# Patient Record
Sex: FEMALE | Race: WHITE
[De-identification: names, ages, dates, MRNs, and addresses within clinical notes are randomized per-mention and may not be internally consistent; named-entity substitution may affect disease eponyms.]

---

## 2017-07-11 ENCOUNTER — HOSPITAL ENCOUNTER (EMERGENCY)
Dept: HOSPITAL 4 - SED | Age: 57
Discharge: HOME | End: 2017-07-11
Payer: COMMERCIAL

## 2017-07-11 VITALS — BODY MASS INDEX: 23.92 KG/M2 | WEIGHT: 130 LBS | HEIGHT: 62 IN

## 2017-07-11 VITALS — SYSTOLIC BLOOD PRESSURE: 123 MMHG

## 2017-07-11 DIAGNOSIS — J44.0: ICD-10-CM

## 2017-07-11 DIAGNOSIS — S16.1XXA: Primary | ICD-10-CM
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
## 2017-07-11 DIAGNOSIS — Z90.89: ICD-10-CM

## 2017-07-11 DIAGNOSIS — Z90.710: ICD-10-CM

## 2017-07-11 PROCEDURE — 72040 X-RAY EXAM NECK SPINE 2-3 VW: CPT

## 2017-07-11 PROCEDURE — 99284 EMERGENCY DEPT VISIT MOD MDM: CPT

## 2020-08-31 ENCOUNTER — HOSPITAL ENCOUNTER (INPATIENT)
Dept: HOSPITAL 15 - ER | Age: 60
LOS: 3 days | Discharge: HOME | DRG: 137 | End: 2020-09-03
Attending: INTERNAL MEDICINE | Admitting: INTERNAL MEDICINE
Payer: COMMERCIAL

## 2020-08-31 VITALS — HEIGHT: 63 IN | BODY MASS INDEX: 24.61 KG/M2 | WEIGHT: 138.89 LBS

## 2020-08-31 VITALS — SYSTOLIC BLOOD PRESSURE: 103 MMHG | DIASTOLIC BLOOD PRESSURE: 67 MMHG

## 2020-08-31 DIAGNOSIS — E87.6: ICD-10-CM

## 2020-08-31 DIAGNOSIS — J44.1: ICD-10-CM

## 2020-08-31 DIAGNOSIS — U07.1: Primary | ICD-10-CM

## 2020-08-31 DIAGNOSIS — Z90.49: ICD-10-CM

## 2020-08-31 DIAGNOSIS — F15.10: ICD-10-CM

## 2020-08-31 DIAGNOSIS — J12.89: ICD-10-CM

## 2020-08-31 LAB
ALBUMIN SERPL-MCNC: 3.4 G/DL (ref 3.4–5)
ALP SERPL-CCNC: 122 U/L (ref 45–117)
ALT SERPL-CCNC: 89 U/L (ref 13–56)
ANION GAP SERPL CALCULATED.3IONS-SCNC: 5 MMOL/L (ref 5–15)
BILIRUB SERPL-MCNC: 0.3 MG/DL (ref 0.2–1)
BUN SERPL-MCNC: 17 MG/DL (ref 7–18)
BUN/CREAT SERPL: 17.7
CALCIUM SERPL-MCNC: 8.1 MG/DL (ref 8.5–10.1)
CHLORIDE SERPL-SCNC: 106 MMOL/L (ref 98–107)
CO2 SERPL-SCNC: 28 MMOL/L (ref 21–32)
GLUCOSE SERPL-MCNC: 97 MG/DL (ref 74–106)
HCT VFR BLD AUTO: 40.5 % (ref 36–46)
HGB BLD-MCNC: 13.7 G/DL (ref 12.2–16.2)
MCH RBC QN AUTO: 31.5 PG (ref 28–32)
MCV RBC AUTO: 93.4 FL (ref 80–100)
NRBC BLD QL AUTO: 0.3 %
POTASSIUM SERPL-SCNC: 2.7 MMOL/L (ref 3.5–5.1)
POTASSIUM SERPL-SCNC: 4 MMOL/L (ref 3.5–5.1)
PROT SERPL-MCNC: 7 G/DL (ref 6.4–8.2)
SODIUM SERPL-SCNC: 139 MMOL/L (ref 136–145)

## 2020-08-31 PROCEDURE — 83615 LACTATE (LD) (LDH) ENZYME: CPT

## 2020-08-31 PROCEDURE — 85379 FIBRIN DEGRADATION QUANT: CPT

## 2020-08-31 PROCEDURE — 83880 ASSAY OF NATRIURETIC PEPTIDE: CPT

## 2020-08-31 PROCEDURE — 81001 URINALYSIS AUTO W/SCOPE: CPT

## 2020-08-31 PROCEDURE — 82728 ASSAY OF FERRITIN: CPT

## 2020-08-31 PROCEDURE — 80053 COMPREHEN METABOLIC PANEL: CPT

## 2020-08-31 PROCEDURE — 84132 ASSAY OF SERUM POTASSIUM: CPT

## 2020-08-31 PROCEDURE — 86141 C-REACTIVE PROTEIN HS: CPT

## 2020-08-31 PROCEDURE — 83735 ASSAY OF MAGNESIUM: CPT

## 2020-08-31 PROCEDURE — 71045 X-RAY EXAM CHEST 1 VIEW: CPT

## 2020-08-31 PROCEDURE — 87086 URINE CULTURE/COLONY COUNT: CPT

## 2020-08-31 PROCEDURE — 84484 ASSAY OF TROPONIN QUANT: CPT

## 2020-08-31 PROCEDURE — 36415 COLL VENOUS BLD VENIPUNCTURE: CPT

## 2020-08-31 PROCEDURE — 93005 ELECTROCARDIOGRAM TRACING: CPT

## 2020-08-31 PROCEDURE — 80307 DRUG TEST PRSMV CHEM ANLYZR: CPT

## 2020-08-31 PROCEDURE — 83605 ASSAY OF LACTIC ACID: CPT

## 2020-08-31 PROCEDURE — 83036 HEMOGLOBIN GLYCOSYLATED A1C: CPT

## 2020-08-31 PROCEDURE — 99291 CRITICAL CARE FIRST HOUR: CPT

## 2020-08-31 PROCEDURE — 85025 COMPLETE CBC W/AUTO DIFF WBC: CPT

## 2020-08-31 PROCEDURE — 94640 AIRWAY INHALATION TREATMENT: CPT

## 2020-08-31 PROCEDURE — 84443 ASSAY THYROID STIM HORMONE: CPT

## 2020-08-31 PROCEDURE — 87081 CULTURE SCREEN ONLY: CPT

## 2020-08-31 RX ADMIN — BUDESONIDE SCH MCG: 180 AEROSOL, POWDER RESPIRATORY (INHALATION) at 22:00

## 2020-08-31 RX ADMIN — ALBUTEROL SULFATE SCH MCG: 108 AEROSOL, METERED RESPIRATORY (INHALATION) at 22:00

## 2020-08-31 NOTE — NUR
Telemetry admit from OKSANA HYLTON admitted to Telemetry unit after NO SBAR received.  Patient oriented to YOKASTA BROWN, RN primary RN, unit, room, bed, and unit policies regarding patient care and 
visiting hours. Patient now on continuous telemetry monitoring, tele box # 11 and telemetry 
reading on arrival to unit is sr 74BPM. Patient placed weighed by bedscale and encouraged to 
call if they need something. All questions and concerns addressed, patient verbalized 
understanding. bed in low position and call light within reach.

## 2020-09-01 VITALS — DIASTOLIC BLOOD PRESSURE: 74 MMHG | SYSTOLIC BLOOD PRESSURE: 124 MMHG

## 2020-09-01 VITALS — DIASTOLIC BLOOD PRESSURE: 73 MMHG | SYSTOLIC BLOOD PRESSURE: 121 MMHG

## 2020-09-01 VITALS — DIASTOLIC BLOOD PRESSURE: 77 MMHG | SYSTOLIC BLOOD PRESSURE: 110 MMHG

## 2020-09-01 VITALS — SYSTOLIC BLOOD PRESSURE: 116 MMHG | DIASTOLIC BLOOD PRESSURE: 58 MMHG

## 2020-09-01 VITALS — DIASTOLIC BLOOD PRESSURE: 67 MMHG | SYSTOLIC BLOOD PRESSURE: 103 MMHG

## 2020-09-01 VITALS — DIASTOLIC BLOOD PRESSURE: 69 MMHG | SYSTOLIC BLOOD PRESSURE: 121 MMHG

## 2020-09-01 VITALS — DIASTOLIC BLOOD PRESSURE: 58 MMHG | SYSTOLIC BLOOD PRESSURE: 116 MMHG

## 2020-09-01 LAB
ALBUMIN SERPL-MCNC: 3 G/DL (ref 3.4–5)
ALCOHOL, URINE: < 3 MG/DL (ref 0–10)
ALP SERPL-CCNC: 103 U/L (ref 45–117)
ALT SERPL-CCNC: 73 U/L (ref 13–56)
AMPHETAMINES UR QL SCN: POSITIVE
ANION GAP SERPL CALCULATED.3IONS-SCNC: 2 MMOL/L (ref 5–15)
BARBITURATES UR QL SCN: NEGATIVE
BENZODIAZ UR QL SCN: NEGATIVE
BILIRUB SERPL-MCNC: 0.3 MG/DL (ref 0.2–1)
BUN SERPL-MCNC: 10 MG/DL (ref 7–18)
BUN/CREAT SERPL: 19.6
BZE UR QL SCN: NEGATIVE
CALCIUM SERPL-MCNC: 8 MG/DL (ref 8.5–10.1)
CANNABINOIDS UR QL SCN: NEGATIVE
CHLORIDE SERPL-SCNC: 110 MMOL/L (ref 98–107)
CO2 SERPL-SCNC: 28 MMOL/L (ref 21–32)
GLUCOSE SERPL-MCNC: 119 MG/DL (ref 74–106)
HCT VFR BLD AUTO: 37.5 % (ref 36–46)
HGB BLD-MCNC: 12.8 G/DL (ref 12.2–16.2)
MCH RBC QN AUTO: 31.8 PG (ref 28–32)
MCV RBC AUTO: 93.5 FL (ref 80–100)
NRBC BLD QL AUTO: 0.3 %
OPIATES UR QL SCN: NEGATIVE
PCP UR QL SCN: NEGATIVE
POTASSIUM SERPL-SCNC: 4.4 MMOL/L (ref 3.5–5.1)
PROT SERPL-MCNC: 6.3 G/DL (ref 6.4–8.2)
SODIUM SERPL-SCNC: 140 MMOL/L (ref 136–145)

## 2020-09-01 RX ADMIN — ZINC SULFATE CAP 220 MG (50 MG ELEMENTAL ZN) SCH MG: 220 (50 ZN) CAP at 09:50

## 2020-09-01 RX ADMIN — ALBUTEROL SULFATE SCH MCG: 108 AEROSOL, METERED RESPIRATORY (INHALATION) at 14:17

## 2020-09-01 RX ADMIN — BUDESONIDE SCH MCG: 180 AEROSOL, POWDER RESPIRATORY (INHALATION) at 21:44

## 2020-09-01 RX ADMIN — ENOXAPARIN SODIUM SCH MG: 40 INJECTION SUBCUTANEOUS at 09:50

## 2020-09-01 RX ADMIN — ALBUTEROL SULFATE SCH MCG: 108 AEROSOL, METERED RESPIRATORY (INHALATION) at 06:55

## 2020-09-01 RX ADMIN — HYDROCODONE BITARTRATE AND ACETAMINOPHEN PRN TAB: 5; 325 TABLET ORAL at 09:50

## 2020-09-01 RX ADMIN — Medication SCH MG: at 09:52

## 2020-09-01 RX ADMIN — Medication SCH UNIT: at 09:52

## 2020-09-01 RX ADMIN — ALBUTEROL SULFATE SCH MCG: 108 AEROSOL, METERED RESPIRATORY (INHALATION) at 21:44

## 2020-09-01 RX ADMIN — BUDESONIDE SCH MCG: 180 AEROSOL, POWDER RESPIRATORY (INHALATION) at 06:55

## 2020-09-01 RX ADMIN — HYDROCODONE BITARTRATE AND ACETAMINOPHEN PRN TAB: 5; 325 TABLET ORAL at 00:25

## 2020-09-01 RX ADMIN — MORPHINE SULFATE PRN MG: 2 INJECTION, SOLUTION INTRAMUSCULAR; INTRAVENOUS at 20:03

## 2020-09-01 NOTE — NUR
Md at bedside



Dr. Alvarenga at bedside discussing POC with patient at this time. This RN at bedside.

## 2020-09-01 NOTE — NUR
Respiratory note:

HR 66, RR 14, SPO2 100% ON RA, BS DIMINISHED. MDI/DPI NOT AT BEDSIDE. WILL ORDER FROM 
PHARMACY. NO SIGNS OR SYMPTOMS OF RESPIRATORY DISTRESS NOTED AT THIS TIME.  CRYSTAL 
AT BEDSIDE.

## 2020-09-01 NOTE — NUR
Respiratory note:

PT SEEN AND ASSESSED AT THIS TIME, NO RESPIRATORY DISTRESS NOTED. PT WAS SLEEPING WHEN 
ENTERING THE ROOM.

HR 75 RR 18 SP02 94% ON 1L NASAL CANNULA.

## 2020-09-01 NOTE — NUR
Nutrition Consult/assessment Notes



please see attached link for complete assessment



Est energy needs BW 59 [id number] kcal (25-30kcal/kg BW), Est protein needs: 59-70g 
(1-1.2g/kg BW). Will reassess prn.  




-------------------------------------------------------------------------------

Addendum: 20 at 1134 by Viridiana Ndiaye RD

-------------------------------------------------------------------------------

Amended: Links added.

## 2020-09-01 NOTE — NUR
PASSWORDS UPDATE

Patient requests her password to be "MOST", will note and ask family members for password 
before any updates are given.

## 2020-09-01 NOTE — NUR
Opening shift note



Assumed care, patient found to be AOx4 resting in bed. No s/s of distress noted or reported 
at this time. Patient states she has pain 4/10 in chest area that increases when she coughs 
and radiates to her back. Patient refusing pain medication at this time and states she will 
notify RN when needed. Update on POC give to patient and education regarding use of call 
light given. Patient verbalizes understanding at this time. Bed in low locked position, call 
light within reach, will continue care.

## 2020-09-02 VITALS — SYSTOLIC BLOOD PRESSURE: 91 MMHG | DIASTOLIC BLOOD PRESSURE: 63 MMHG

## 2020-09-02 VITALS — SYSTOLIC BLOOD PRESSURE: 139 MMHG | DIASTOLIC BLOOD PRESSURE: 89 MMHG

## 2020-09-02 VITALS — SYSTOLIC BLOOD PRESSURE: 104 MMHG | DIASTOLIC BLOOD PRESSURE: 73 MMHG

## 2020-09-02 VITALS — DIASTOLIC BLOOD PRESSURE: 61 MMHG | SYSTOLIC BLOOD PRESSURE: 107 MMHG

## 2020-09-02 VITALS — SYSTOLIC BLOOD PRESSURE: 125 MMHG | DIASTOLIC BLOOD PRESSURE: 82 MMHG

## 2020-09-02 RX ADMIN — ALBUTEROL SULFATE SCH MCG: 108 AEROSOL, METERED RESPIRATORY (INHALATION) at 14:25

## 2020-09-02 RX ADMIN — BUDESONIDE SCH MCG: 180 AEROSOL, POWDER RESPIRATORY (INHALATION) at 23:36

## 2020-09-02 RX ADMIN — MORPHINE SULFATE PRN MG: 2 INJECTION, SOLUTION INTRAMUSCULAR; INTRAVENOUS at 06:55

## 2020-09-02 RX ADMIN — BUDESONIDE SCH MCG: 180 AEROSOL, POWDER RESPIRATORY (INHALATION) at 06:55

## 2020-09-02 RX ADMIN — ENOXAPARIN SODIUM SCH MG: 40 INJECTION SUBCUTANEOUS at 09:40

## 2020-09-02 RX ADMIN — MORPHINE SULFATE PRN MG: 2 INJECTION, SOLUTION INTRAMUSCULAR; INTRAVENOUS at 21:52

## 2020-09-02 RX ADMIN — Medication SCH UNIT: at 09:40

## 2020-09-02 RX ADMIN — AZITHROMYCIN MONOHYDRATE SCH MG: 250 TABLET ORAL at 09:40

## 2020-09-02 RX ADMIN — CEFTRIAXONE SODIUM SCH MLS/HR: 1 INJECTION, POWDER, FOR SOLUTION INTRAMUSCULAR; INTRAVENOUS at 09:39

## 2020-09-02 RX ADMIN — Medication SCH MG: at 09:40

## 2020-09-02 RX ADMIN — HYDROCODONE BITARTRATE AND ACETAMINOPHEN PRN TAB: 5; 325 TABLET ORAL at 13:32

## 2020-09-02 RX ADMIN — ALBUTEROL SULFATE SCH MCG: 108 AEROSOL, METERED RESPIRATORY (INHALATION) at 06:55

## 2020-09-02 RX ADMIN — ZINC SULFATE CAP 220 MG (50 MG ELEMENTAL ZN) SCH MG: 220 (50 ZN) CAP at 09:39

## 2020-09-02 RX ADMIN — ALBUTEROL SULFATE SCH MCG: 108 AEROSOL, METERED RESPIRATORY (INHALATION) at 23:36

## 2020-09-02 NOTE — NUR
IV removal/Insertion

Patient accidentally pulled out IV. IV DC'd with clean sterile technique, catheter fully 
intact. Pressure dressing applied to site. Patient tolerated well.

IV access obtained, via clean sterile technique by inserting 22 gauge catheter at the left 
wrist after 1 attempt. IV secured properly. No trauma to site. Patient tolerated well.

## 2020-09-02 NOTE — NUR
OPENING SHIFT NOTE

Assumed care of patient who is A&O x4. Currently on RA with no s/s of distress. Reports 
pressure in chest upon deep inspiration and cough with green sputum. PIV in right wrist is 
intact and patent. Flushed with 10ml NS. Patient is ambulatory without the use of assistive 
devices at baseline. POC discussed and patient verbalizes understanding. Bed is in low 
locked position with side rails up x2. Call light is within reach and patient encouraged to 
call for assistance when needed. Will continue to monitor for changes PRN.

## 2020-09-02 NOTE — NUR
AMA

According to patient, friends arrived to ER to  patient's car key. Car key was left 
in the Ante room, to be picked up by resource nurse and taken down to ER when available. 
According to patient, her friends had to leave prior to the key being brought to them. No 
call from ER staff received indicating that anyone had arrived to  patient's car key. 
Patient is now upset, stating that she is going to leave the unit AMA so that she can handle 
her personal affairs. Patient advised against leaving AMA. Encouraged to stay overnight as 
discharge is planned for tomorrow. Patient verbalizes understanding, yet states that she is 
still going to leave. Patients car key returned. Patient then laid down on right side, 
covering herself with her blanket and refused to continue conversation with this RN. Patient 
remains on unit in her room at this time. Will continue to monitor.

## 2020-09-03 VITALS — DIASTOLIC BLOOD PRESSURE: 60 MMHG | SYSTOLIC BLOOD PRESSURE: 120 MMHG

## 2020-09-03 VITALS — DIASTOLIC BLOOD PRESSURE: 71 MMHG | SYSTOLIC BLOOD PRESSURE: 120 MMHG

## 2020-09-03 VITALS — SYSTOLIC BLOOD PRESSURE: 121 MMHG | DIASTOLIC BLOOD PRESSURE: 66 MMHG

## 2020-09-03 RX ADMIN — Medication SCH UNIT: at 09:25

## 2020-09-03 RX ADMIN — ZINC SULFATE CAP 220 MG (50 MG ELEMENTAL ZN) SCH MG: 220 (50 ZN) CAP at 09:25

## 2020-09-03 RX ADMIN — ALBUTEROL SULFATE SCH MCG: 108 AEROSOL, METERED RESPIRATORY (INHALATION) at 15:10

## 2020-09-03 RX ADMIN — ALBUTEROL SULFATE SCH MCG: 108 AEROSOL, METERED RESPIRATORY (INHALATION) at 07:27

## 2020-09-03 RX ADMIN — CEFTRIAXONE SODIUM SCH MLS/HR: 1 INJECTION, POWDER, FOR SOLUTION INTRAMUSCULAR; INTRAVENOUS at 09:25

## 2020-09-03 RX ADMIN — Medication SCH MG: at 09:25

## 2020-09-03 RX ADMIN — AZITHROMYCIN MONOHYDRATE SCH MG: 250 TABLET ORAL at 09:26

## 2020-09-03 RX ADMIN — BUDESONIDE SCH MCG: 180 AEROSOL, POWDER RESPIRATORY (INHALATION) at 07:27

## 2020-09-03 RX ADMIN — ENOXAPARIN SODIUM SCH MG: 40 INJECTION SUBCUTANEOUS at 09:26

## 2020-09-03 NOTE — NUR
Discharge instructions given as ordered. Encourage to follow up with PMD as instructed. 
INSTRUCTED TO TAKE NEW MEDICATIONS AS PRESCRIBED. PROVIDED WITH [place] DC CARD FOR PCP. 
PROVIDED WITH URGENT CARE CLINIC SHEET. INSTRUCTED TO FOLLOW UP WITH PCP. All questions and 
concerns addressed. Patient verbalized understanding. IV removed with catheter intact, 
pressure dressing applied.  Telemetry unit returned to ICU. Patient taken to vehicle via 
wheelchair with all personal belongings, accompanied by staff and family member. No distress 
noted at time of departure.

## 2020-09-06 ENCOUNTER — HOSPITAL ENCOUNTER (EMERGENCY)
Dept: HOSPITAL 15 - ER | Age: 60
Discharge: HOME | End: 2020-09-06
Payer: COMMERCIAL

## 2020-09-06 VITALS — SYSTOLIC BLOOD PRESSURE: 124 MMHG | DIASTOLIC BLOOD PRESSURE: 85 MMHG

## 2020-09-06 VITALS — WEIGHT: 130 LBS | BODY MASS INDEX: 23.92 KG/M2 | HEIGHT: 62 IN

## 2020-09-06 DIAGNOSIS — F41.9: Primary | ICD-10-CM
